# Patient Record
Sex: FEMALE | Race: WHITE | Employment: OTHER | URBAN - METROPOLITAN AREA
[De-identification: names, ages, dates, MRNs, and addresses within clinical notes are randomized per-mention and may not be internally consistent; named-entity substitution may affect disease eponyms.]

---

## 2017-04-26 ENCOUNTER — HOSPITAL ENCOUNTER (OUTPATIENT)
Dept: MAMMOGRAPHY | Age: 52
Discharge: HOME | End: 2017-04-26
Attending: FAMILY MEDICINE
Payer: COMMERCIAL

## 2017-04-26 DIAGNOSIS — Z12.31 VISIT FOR SCREENING MAMMOGRAM: ICD-10-CM

## 2017-04-26 PROCEDURE — 77067 SCR MAMMO BI INCL CAD: CPT

## 2017-06-06 ENCOUNTER — HOSPITAL ENCOUNTER (OUTPATIENT)
Dept: MAMMOGRAPHY | Age: 52
Discharge: HOME | End: 2017-06-06
Attending: FAMILY MEDICINE
Payer: COMMERCIAL

## 2017-06-06 DIAGNOSIS — R92.8 ABNORMAL MAMMOGRAM: ICD-10-CM

## 2017-06-06 PROCEDURE — 77065 DX MAMMO INCL CAD UNI: CPT

## 2019-07-23 ENCOUNTER — HOSPITAL ENCOUNTER (OUTPATIENT)
Dept: MAMMOGRAPHY | Age: 54
Discharge: HOME | End: 2019-07-23
Attending: FAMILY MEDICINE
Payer: COMMERCIAL

## 2019-07-23 DIAGNOSIS — Z12.39 BREAST SCREENING, UNSPECIFIED: ICD-10-CM

## 2019-07-23 PROCEDURE — 77067 SCR MAMMO BI INCL CAD: CPT

## 2021-06-02 ENCOUNTER — TRANSCRIBE ORDER (OUTPATIENT)
Dept: SCHEDULING | Age: 56
End: 2021-06-02

## 2021-06-02 DIAGNOSIS — Z12.31 SCREENING MAMMOGRAM FOR HIGH-RISK PATIENT: Primary | ICD-10-CM

## 2021-06-17 ENCOUNTER — HOSPITAL ENCOUNTER (OUTPATIENT)
Dept: MAMMOGRAPHY | Age: 56
Discharge: HOME | End: 2021-06-17
Attending: INTERNAL MEDICINE
Payer: COMMERCIAL

## 2021-06-17 DIAGNOSIS — Z12.31 SCREENING MAMMOGRAM FOR HIGH-RISK PATIENT: ICD-10-CM

## 2021-06-17 PROCEDURE — 77067 SCR MAMMO BI INCL CAD: CPT

## 2022-08-30 ENCOUNTER — TRANSCRIBE ORDER (OUTPATIENT)
Dept: SCHEDULING | Age: 57
End: 2022-08-30

## 2022-08-30 DIAGNOSIS — Z12.31 VISIT FOR SCREENING MAMMOGRAM: Primary | ICD-10-CM

## 2022-10-07 ENCOUNTER — HOSPITAL ENCOUNTER (OUTPATIENT)
Dept: MAMMOGRAPHY | Age: 57
Discharge: HOME | End: 2022-10-07
Attending: INTERNAL MEDICINE
Payer: COMMERCIAL

## 2022-10-07 DIAGNOSIS — Z12.31 VISIT FOR SCREENING MAMMOGRAM: ICD-10-CM

## 2022-10-07 PROCEDURE — 77067 SCR MAMMO BI INCL CAD: CPT

## 2023-05-19 ENCOUNTER — TELEPHONE (OUTPATIENT)
Age: 58
End: 2023-05-19

## 2023-06-02 ENCOUNTER — PROCEDURE VISIT (OUTPATIENT)
Age: 58
End: 2023-06-02

## 2023-06-02 DIAGNOSIS — R20.2 PARESTHESIA: Primary | ICD-10-CM

## 2023-06-02 NOTE — PROGRESS NOTES
Impression:    Extensive electrodiagnostic examination of the right and left lower extremities shows absent bilateral H-reflexes and slightly prolonged bilateral F-tibial responses, which are non-specific in etiology, but may indicate early signs of polyneuropathy. Otherwise, sensory and motor responses are within normal limits.       Kadeem Garcia MD  Diplomate, American Board of Psychiatry and Neurology  Diplomate, Neuromuscular Medicine  Diplomate, American Board of Electrodiagnostic Medicine  Director, [address] [place] [address] Accredited Laboratory with Exemplary Status            Nerve Conduction Studies  Anti Sensory Summary Table     Stim Site NR Peak (ms) Norm Peak (ms) P-T Amp (µV) Norm P-T Amp Site1 Site2 Dist (cm)   Left Sup Fibular Anti Sensory (Lat ankle)  32 °C   Lower leg    2.8 <4.5 7.1 >5 Lower leg Lat ankle 10.0   Site 2    2.5  5.3       Site 3    2.7  4.7       Right Sup Fibular Anti Sensory (Lat ankle)  31.1 °C   Lower leg    2.9 <4.5 9.2 >5 Lower leg Lat ankle 10.0   Left Sural Anti Sensory (Lat Mall)  30.6 °C   Calf    3.9 <4.5 5.5 >4.0 Calf Lat Mall 14.0   Right Sural Anti Sensory (Lat Mall)  29.6 °C   Calf    3.8 <4.5 4.1 >4.0 Calf Lat Mall 14.0     Motor Summary Table     Stim Site NR Onset (ms) Norm Onset (ms) O-P Amp (mV) Norm O-P Amp Amp (Prev) (%) Site1 Site2 Dist (cm) Ilia (m/s) Norm Ilia (m/s)   Left Fibular Motor (Ext Dig Brev)  22.1 °C   Ankle    3.8 <6.5 3.3 >2.6 100.0 Ankle Ext Dig Brev 8.0     B Fib    10.6  2.8  84.8 B Fib Ankle 33.0 49 >38   Poplt    12.3  2.8  100.0 Poplt B Fib 10.0 59 >42   Right Fibular Motor (Ext Dig Brev)  22.1 °C   Ankle    2.8 <6.5 2.6 >2.6 100.0 Ankle Ext Dig Brev 8.0     B Fib    10.5  2.2  84.6 B Fib Ankle 32.0 42 >38   Poplt    12.0  2.1  95.5 Poplt B Fib 10.0 67 >42   Left Tibial Motor (Abd Montes Brev)  32.8 °C   Ankle    4.5 <6.1 7.9 >5.3 100.0 Ankle Abd Montes Brev 8.0     Knee    12.5  5.8  73.4 Knee Ankle 34.0 43 >39   Right Tibial Motor (Abd Montes Brev)  31.6

## 2023-12-28 ENCOUNTER — HOSPITAL ENCOUNTER (OUTPATIENT)
Facility: HOSPITAL | Age: 58
Discharge: HOME | End: 2023-12-28
Attending: INTERNAL MEDICINE

## 2023-12-28 DIAGNOSIS — Z00.00 ROUTINE GENERAL MEDICAL EXAMINATION AT A HEALTH CARE FACILITY: ICD-10-CM

## 2023-12-28 PROCEDURE — 75571 CT HRT W/O DYE W/CA TEST: CPT
